# Patient Record
Sex: MALE | Race: BLACK OR AFRICAN AMERICAN | Employment: UNEMPLOYED | URBAN - METROPOLITAN AREA
[De-identification: names, ages, dates, MRNs, and addresses within clinical notes are randomized per-mention and may not be internally consistent; named-entity substitution may affect disease eponyms.]

---

## 2017-11-08 ENCOUNTER — TRANSFERRED RECORDS (OUTPATIENT)
Dept: HEALTH INFORMATION MANAGEMENT | Facility: CLINIC | Age: 37
End: 2017-11-08

## 2017-11-21 ENCOUNTER — APPOINTMENT (OUTPATIENT)
Dept: CT IMAGING | Facility: CLINIC | Age: 37
End: 2017-11-21
Attending: EMERGENCY MEDICINE
Payer: COMMERCIAL

## 2017-11-21 ENCOUNTER — HOSPITAL ENCOUNTER (EMERGENCY)
Facility: CLINIC | Age: 37
Discharge: HOME | End: 2017-11-21
Attending: EMERGENCY MEDICINE | Admitting: EMERGENCY MEDICINE
Payer: COMMERCIAL

## 2017-11-21 VITALS
WEIGHT: 105 LBS | BODY MASS INDEX: 15.91 KG/M2 | SYSTOLIC BLOOD PRESSURE: 116 MMHG | DIASTOLIC BLOOD PRESSURE: 86 MMHG | RESPIRATION RATE: 18 BRPM | OXYGEN SATURATION: 99 % | HEART RATE: 60 BPM | HEIGHT: 68 IN

## 2017-11-21 DIAGNOSIS — R10.84 ABDOMINAL PAIN, GENERALIZED: ICD-10-CM

## 2017-11-21 DIAGNOSIS — R63.4 LOSS OF WEIGHT: ICD-10-CM

## 2017-11-21 LAB
ALBUMIN SERPL-MCNC: 3.5 G/DL (ref 3.4–5)
ALBUMIN UR-MCNC: NEGATIVE MG/DL
ALP SERPL-CCNC: 40 U/L (ref 40–150)
ALT SERPL W P-5'-P-CCNC: 90 U/L (ref 0–70)
ANION GAP SERPL CALCULATED.3IONS-SCNC: 6 MMOL/L (ref 3–14)
APPEARANCE UR: CLEAR
AST SERPL W P-5'-P-CCNC: 68 U/L (ref 0–45)
BASOPHILS # BLD AUTO: 0 10E9/L (ref 0–0.2)
BASOPHILS NFR BLD AUTO: 0 %
BILIRUB SERPL-MCNC: 0.8 MG/DL (ref 0.2–1.3)
BILIRUB UR QL STRIP: NEGATIVE
BUN SERPL-MCNC: 35 MG/DL (ref 7–30)
CALCIUM SERPL-MCNC: 8.4 MG/DL (ref 8.5–10.1)
CHLORIDE SERPL-SCNC: 96 MMOL/L (ref 94–109)
CO2 SERPL-SCNC: 33 MMOL/L (ref 20–32)
COLOR UR AUTO: ABNORMAL
CREAT SERPL-MCNC: 1.03 MG/DL (ref 0.66–1.25)
CRP SERPL-MCNC: <2.9 MG/L (ref 0–8)
DIFFERENTIAL METHOD BLD: ABNORMAL
EOSINOPHIL # BLD AUTO: 0 10E9/L (ref 0–0.7)
EOSINOPHIL NFR BLD AUTO: 0.4 %
ERYTHROCYTE [DISTWIDTH] IN BLOOD BY AUTOMATED COUNT: 14 % (ref 10–15)
GFR SERPL CREATININE-BSD FRML MDRD: 80 ML/MIN/1.7M2
GLUCOSE SERPL-MCNC: 89 MG/DL (ref 70–99)
GLUCOSE UR STRIP-MCNC: NEGATIVE MG/DL
HCT VFR BLD AUTO: 42.3 % (ref 40–53)
HGB BLD-MCNC: 14 G/DL (ref 13.3–17.7)
HGB UR QL STRIP: NEGATIVE
IMM GRANULOCYTES # BLD: 0 10E9/L (ref 0–0.4)
IMM GRANULOCYTES NFR BLD: 0 %
KETONES UR STRIP-MCNC: NEGATIVE MG/DL
LACTATE BLD-SCNC: 0.8 MMOL/L (ref 0.7–2)
LEUKOCYTE ESTERASE UR QL STRIP: NEGATIVE
LIPASE SERPL-CCNC: 271 U/L (ref 73–393)
LYMPHOCYTES # BLD AUTO: 1.3 10E9/L (ref 0.8–5.3)
LYMPHOCYTES NFR BLD AUTO: 54.1 %
MAGNESIUM SERPL-MCNC: 2.3 MG/DL (ref 1.6–2.3)
MCH RBC QN AUTO: 25.9 PG (ref 26.5–33)
MCHC RBC AUTO-ENTMCNC: 33.1 G/DL (ref 31.5–36.5)
MCV RBC AUTO: 78 FL (ref 78–100)
MONOCYTES # BLD AUTO: 0.1 10E9/L (ref 0–1.3)
MONOCYTES NFR BLD AUTO: 5.2 %
MUCOUS THREADS #/AREA URNS LPF: PRESENT /LPF
NEUTROPHILS # BLD AUTO: 0.9 10E9/L (ref 1.6–8.3)
NEUTROPHILS NFR BLD AUTO: 40.3 %
NITRATE UR QL: NEGATIVE
NRBC # BLD AUTO: 0 10*3/UL
NRBC BLD AUTO-RTO: 0 /100
PH UR STRIP: 7 PH (ref 5–7)
PLATELET # BLD AUTO: 99 10E9/L (ref 150–450)
POTASSIUM SERPL-SCNC: 3.7 MMOL/L (ref 3.4–5.3)
PROT SERPL-MCNC: 7.5 G/DL (ref 6.8–8.8)
RBC # BLD AUTO: 5.41 10E12/L (ref 4.4–5.9)
RBC #/AREA URNS AUTO: <1 /HPF (ref 0–2)
SODIUM SERPL-SCNC: 135 MMOL/L (ref 133–144)
SOURCE: ABNORMAL
SP GR UR STRIP: 1.04 (ref 1–1.03)
TSH SERPL DL<=0.005 MIU/L-ACNC: 0.62 MU/L (ref 0.4–4)
UROBILINOGEN UR STRIP-MCNC: NORMAL MG/DL (ref 0–2)
WBC # BLD AUTO: 2.3 10E9/L (ref 4–11)
WBC #/AREA URNS AUTO: <1 /HPF (ref 0–2)

## 2017-11-21 PROCEDURE — 25000128 H RX IP 250 OP 636: Performed by: RADIOLOGY

## 2017-11-21 PROCEDURE — 81001 URINALYSIS AUTO W/SCOPE: CPT | Performed by: EMERGENCY MEDICINE

## 2017-11-21 PROCEDURE — 74177 CT ABD & PELVIS W/CONTRAST: CPT

## 2017-11-21 PROCEDURE — 99284 EMERGENCY DEPT VISIT MOD MDM: CPT | Mod: Z6 | Performed by: EMERGENCY MEDICINE

## 2017-11-21 PROCEDURE — 86140 C-REACTIVE PROTEIN: CPT | Performed by: EMERGENCY MEDICINE

## 2017-11-21 PROCEDURE — 99285 EMERGENCY DEPT VISIT HI MDM: CPT | Mod: 25 | Performed by: EMERGENCY MEDICINE

## 2017-11-21 PROCEDURE — 83605 ASSAY OF LACTIC ACID: CPT | Performed by: EMERGENCY MEDICINE

## 2017-11-21 PROCEDURE — 85025 COMPLETE CBC W/AUTO DIFF WBC: CPT | Performed by: EMERGENCY MEDICINE

## 2017-11-21 PROCEDURE — 80053 COMPREHEN METABOLIC PANEL: CPT | Performed by: EMERGENCY MEDICINE

## 2017-11-21 PROCEDURE — 83735 ASSAY OF MAGNESIUM: CPT | Performed by: EMERGENCY MEDICINE

## 2017-11-21 PROCEDURE — 83690 ASSAY OF LIPASE: CPT | Performed by: EMERGENCY MEDICINE

## 2017-11-21 PROCEDURE — 84443 ASSAY THYROID STIM HORMONE: CPT | Performed by: EMERGENCY MEDICINE

## 2017-11-21 RX ORDER — IOPAMIDOL 755 MG/ML
65 INJECTION, SOLUTION INTRAVASCULAR ONCE
Status: COMPLETED | OUTPATIENT
Start: 2017-11-21 | End: 2017-11-21

## 2017-11-21 RX ORDER — BISACODYL 5 MG/1
15 TABLET, DELAYED RELEASE ORAL ONCE
Qty: 4 TABLET | Refills: 0 | Status: SHIPPED | OUTPATIENT
Start: 2017-11-21 | End: 2017-11-21

## 2017-11-21 RX ADMIN — IOPAMIDOL 65 ML: 755 INJECTION, SOLUTION INTRAVENOUS at 13:41

## 2017-11-21 ASSESSMENT — ENCOUNTER SYMPTOMS
ABDOMINAL PAIN: 1
EYE REDNESS: 0
HEADACHES: 0
CONFUSION: 0
APPETITE CHANGE: 1
FEVER: 0
NECK STIFFNESS: 0
COLOR CHANGE: 0
DIFFICULTY URINATING: 0
UNEXPECTED WEIGHT CHANGE: 1
SHORTNESS OF BREATH: 0
ARTHRALGIAS: 0

## 2017-11-21 NOTE — ED AVS SNAPSHOT
[facility], Emergency Department    [address] [facility] [address]    Phone:  [phone]                                       Bruce Barrera   MRN: [id number]    Department:  [facility], Emergency Department   Date of Visit:  11/21/2017           Patient Information     Date Of Birth          1980        Your diagnoses for this visit were:     Loss of weight     Abdominal pain, generalized        You were seen by Lewis Peres MD.        Discharge Instructions       Follow up with Primary Care at the [facility] at [address] [place] [id number]. You will see Dr. Elizabeth at 1 pm on December 5th. Please cancel this appointment if you are not able to make it. [phone]    Future Appointments        Provider Department Dept Phone Center    12/5/2017 1:00 PM Javier Elizabeth [facility] [phone] [facility]      24 Hour Appointment Hotline       To make an appointment at any [facility], call [phone] ([phone]). If you don't have a family doctor or clinic, we will help you find one. [facility] clinics are conveniently located to serve the needs of you and your family.          ED Discharge Orders     COLOREC CANC SCRN,SCR COLONOSCOPY+BE           GASTROENTEROLOGY ADULT REF CONSULT ONLY       Preferred Location: [facility] [phone]      Please be aware that coverage of these services is subject to the terms and limitations of your health insurance plan.  Call member services at your health plan with any benefit or coverage questions.  Any procedures must be performed at a [facility] facility OR coordinated by your clinic's referral office.    Please bring the following with you to your appointment:    (1) Any X-Rays, CTs or MRIs which have been performed.  Contact the facility where they were done to arrange for  prior to your scheduled appointment.    (2) List of current medications   (3) This referral request   (4) Any  "documents/labs given to you for this referral            UPPER GI ENDOSCOPY                    Review of your medicines      Notice     You have not been prescribed any medications.            Procedures and tests performed during your visit     CBC with platelets differential    CRP inflammation    CT Abdomen Pelvis w Contrast    Comprehensive metabolic panel    Lactic acid    Lipase    Magnesium    Orthostatic blood pressure and pulse    TSH    UA with Microscopic reflex to Culture    Weigh patient      Orders Needing Specimen Collection     None      Pending Results     No orders found from 11/19/2017 to 11/22/2017.            Pending Culture Results     No orders found from 11/19/2017 to 11/22/2017.            Pending Results Instructions     If you had any lab results that were not finalized at the time of your Discharge, you can call the ED Lab Result RN at [phone]. You will be contacted by this team for any positive Lab results or changes in treatment. The nurses are available 7 days a week from 10A to 6:30P.  You can leave a message 24 hours per day and they will return your call.        Thank you for choosing [facility]       Thank you for choosing [facility] for your care. Our goal is always to provide you with excellent care. Hearing back from our patients is one way we can continue to improve our services. Please take a few minutes to complete the written survey that you may receive in the mail after you visit with us. Thank you!        [employer] Information     [employer] lets you send messages to your doctor, view your test results, renew your prescriptions, schedule appointments and more. To sign up, go to [email or web address] . Click on \"Log in\" on the left side of the screen, which will take you to the Welcome page. Then click on \"Sign up Now\" on the right side of the page.     You will be asked to enter the access code listed below, as well as some personal information. Please follow the directions " to create your username and password.     Your access code is: [email or web address]  Expires: 2018  5:01 PM     Your access code will  in 90 days. If you need help or a new code, please call your [facility] clinic or [phone].        Care EveryWhere ID     This is your Care EveryWhere ID. This could be used by other organizations to access your [facility] medical records  [id number]        Equal Access to Services     MADAI RAMIREZ : Hadii aad ku hadasho Sojakali, waaxda luqadaha, qaybta kaalmada adeegyada, joyce stone . So [facility] [phone].    ATENCIÓN: Si habla español, tiene a mar disposición servicios gratuitos de asistencia lingüística. Llame al [phone].    We comply with applicable federal civil rights laws and [place] laws. We do not discriminate on the basis of race, color, national origin, age, disability, sex, sexual orientation, or gender identity.            After Visit Summary       This is your record. Keep this with you and show to your community pharmacist(s) and doctor(s) at your next visit.

## 2017-11-21 NOTE — ED AVS SNAPSHOT
[facility] [place], Emergency Department    [address] [place] [address]    Phone:  [phone]                                       Bruce Barrera   MRN: [id number]    Department:  [facility], Emergency Department   Date of Visit:  11/21/2017           After Visit Summary Signature Page     I have received my discharge instructions, and my questions have been answered. I have discussed any challenges I see with this plan with the nurse or doctor.    ..........................................................................................................................................  Patient/Patient Representative Signature      ..........................................................................................................................................  Patient Representative Print Name and Relationship to Patient    ..................................................               ................................................  Date                                            Time    ..........................................................................................................................................  Reviewed by Signature/Title    ...................................................              ..............................................  Date                                                            Time

## 2017-11-21 NOTE — DISCHARGE INSTRUCTIONS
Follow up with Primary Care at the [facility] at [address] [place] [address]. You will see Dr. Elizabeth at 1 pm on December 5th. Please cancel this appointment if you are not able to make it. [phone]

## 2017-11-21 NOTE — ED NOTES
"Triage Assessment & Note:    /85  Pulse 53  Resp 18  Ht 1.727 m (5' 8\")  Wt 47.6 kg (105 lb)  SpO2 100%  BMI 15.97 kg/m2    Patient presents with: c/o abdominal pain over 6 months. PT reports a 50lb weight loss, and inability to keep PO intake down.     Home Treatments/Remedies: None    Febrile / Afebrile? Afebrile    Duration of C/o:  6 months    Jose Angel Suero  November 21, 2017      "

## 2017-11-21 NOTE — ED PROVIDER NOTES
History     Chief Complaint   Patient presents with     Abdominal Pain     EPI Barrera is a 37 year old male who presents to the ED with abdominal pain. Patient states he is from [place] and recently came to [place]. He state he has a history of chronic epigastric abdominal pan, constipation for the past 5 months, and has lost 40-50 lbs in the past 5 months. He reports his abdominal pain is made worse with eating, drinking or sitting and describes it as burning pain diffusely over his entire abdomen.  He also states he hasn't been eating much, but can tolerate fluids. He also has complaints of some shortness of breath. He is here because he wants to understand what's wrong with him. He states he has had previous work ups in the past, but they were negative. He states he did see a GI doctor in the past and who recommended he do an upper GI endoscopy, but the patient didn't schedule the appointment because the wait was too long. He did bring in an abdomen/pelvic CT report from 11/8/17 from [facility] which was negative for bowel obstruction and hydronephrosis, but did show severe reflux of contrast iliac veins, and enhancing lesions in hepatic segments.     PAST MEDICAL HISTORY  No past medical history on file.  PAST SURGICAL HISTORY  No past surgical history on file.  FAMILY HISTORY  No family history on file.  SOCIAL HISTORY  Social History   Substance Use Topics     Smoking status: Not on file     Smokeless tobacco: Not on file     Alcohol use Not on file     MEDICATIONS  No current facility-administered medications for this encounter.      Current Outpatient Prescriptions   Medication     bisacodyl (DULCOLAX) 5 MG EC tablet     polyethylene glycol (GOLYTELY/NULYTELY) 236 G suspension     ALLERGIES  No Known Allergies    I have reviewed the Medications, Allergies, Past Medical and Surgical History, and Social History in the Epic system.    Review of Systems   Constitutional: Positive for appetite  "change (loss of appetite) and unexpected weight change (loss of 40-50 lbs/5 months). Negative for fever.   HENT: Negative for congestion.    Eyes: Negative for redness.   Respiratory: Negative for shortness of breath.    Cardiovascular: Negative for chest pain.   Gastrointestinal: Positive for abdominal pain (diffuse).   Genitourinary: Negative for difficulty urinating.   Musculoskeletal: Negative for arthralgias and neck stiffness.   Skin: Negative for color change.   Neurological: Negative for headaches.   Psychiatric/Behavioral: Negative for confusion.   All other systems reviewed and are negative.      Physical Exam   BP: 115/85  Pulse: 53  Resp: 18  Height: 172.7 cm (5' 8\")  Weight: 47.6 kg (105 lb)  SpO2: 100 %  Lying Orthostatic BP: 110/92  Sitting Orthostatic BP: 118/97  Standing Orthostatic BP: 112/95      Physical Exam   Constitutional: No distress.   HENT:   Head: Atraumatic.   Mouth/Throat: Oropharynx is clear and moist. No oropharyngeal exudate.   Eyes: Pupils are equal, round, and reactive to light. No scleral icterus.   Cardiovascular: Normal heart sounds and intact distal pulses.    Pulmonary/Chest: Breath sounds normal. No respiratory distress.   Abdominal: Soft. Bowel sounds are normal. There is no tenderness.   Musculoskeletal: He exhibits no edema or tenderness.   Skin: Skin is warm. No rash noted. He is not diaphoretic.       ED Course     ED Course     Procedures   11:44 AM  The patient was seen and examined by Dr. Peres in Room 21.           Results for orders placed or performed during the hospital encounter of 11/21/17   CT Abdomen Pelvis w Contrast    Narrative    EXAMINATION: CT ABDOMEN PELVIS W CONTRAST, 11/21/2017 1:53 PM    TECHNIQUE:  Helical CT images from the lung bases through the  symphysis pubis were obtained with IV contrast. Contrast dose: 65 cc  of isovue 370    COMPARISON: None    HISTORY: Abdominal pain, weight loss, rule out malignancy;     FINDINGS: Overall the appearance of " the study is in an arterial phase,  slightly limiting interpretation.    Abdomen and pelvis: 19 x 16 mm enhancing focus in hepatic segment 6  (series 3 image 78). Additional arterial enhancing focus measuring 12  x 21 mm near the hepatic hilum. The gallbladder, spleen, adrenal  glands, and pancreas demonstrate no definite abnormality. No dilated  loops of bowel. Oral contrast is seen through the rectum. No free air.  No renal masses, stones or hydronephrosis. The bladder is relatively  distended. Abdominal aorta is nonaneurysmal. Mild reflux of contrast  into the IVC and right hepatic vein.    Lung bases: The lung bases are clear. Heart size is normal.    Bones and soft tissues: No worrisome bony lesions. Virtually no  subcutaneous fat.      Impression    IMPRESSION: Examination is technically limited by image acquisition in  the arterial phase.    1. Two indeterminate arterially enhancing lesions in the liver  representing flash filling hemangiomas in the absence of known liver  disease. Dedicated liver MRI is recommended for better  characterization.  2. Reflux of contrast into the IVC and right hepatic vein, while this  may be related to power injection, can also be seen in decreased  cardiac function. Consider echocardiogram if clinically indicated.    I have personally reviewed the examination and initial interpretation  and I agree with the findings.    SAAD LAGUNA MD ([place])   CBC with platelets differential   Result Value Ref Range    WBC 2.3 (L) 4.0 - 11.0 10e9/L    RBC Count 5.41 4.4 - 5.9 10e12/L    Hemoglobin 14.0 13.3 - 17.7 g/dL    Hematocrit 42.3 40.0 - 53.0 %    MCV 78 78 - 100 fl    MCH 25.9 (L) 26.5 - 33.0 pg    MCHC 33.1 31.5 - 36.5 g/dL    RDW 14.0 10.0 - 15.0 %    Platelet Count 99 (L) 150 - 450 10e9/L    Diff Method Automated Method     % Neutrophils 40.3 %    % Lymphocytes 54.1 %    % Monocytes 5.2 %    % Eosinophils 0.4 %    % Basophils 0.0 %    % Immature Granulocytes 0.0 %    Nucleated  RBCs 0 0 /100    Absolute Neutrophil 0.9 (L) 1.6 - 8.3 10e9/L    Absolute Lymphocytes 1.3 0.8 - 5.3 10e9/L    Absolute Monocytes 0.1 0.0 - 1.3 10e9/L    Absolute Eosinophils 0.0 0.0 - 0.7 10e9/L    Absolute Basophils 0.0 0.0 - 0.2 10e9/L    Abs Immature Granulocytes 0.0 0 - 0.4 10e9/L    Absolute Nucleated RBC 0.0    Comprehensive metabolic panel   Result Value Ref Range    Sodium 135 133 - 144 mmol/L    Potassium 3.7 3.4 - 5.3 mmol/L    Chloride 96 94 - 109 mmol/L    Carbon Dioxide 33 (H) 20 - 32 mmol/L    Anion Gap 6 3 - 14 mmol/L    Glucose 89 70 - 99 mg/dL    Urea Nitrogen 35 (H) 7 - 30 mg/dL    Creatinine 1.03 0.66 - 1.25 mg/dL    GFR Estimate 80 >60 mL/min/1.7m2    GFR Estimate If Black >90 >60 mL/min/1.7m2    Calcium 8.4 (L) 8.5 - 10.1 mg/dL    Bilirubin Total 0.8 0.2 - 1.3 mg/dL    Albumin 3.5 3.4 - 5.0 g/dL    Protein Total 7.5 6.8 - 8.8 g/dL    Alkaline Phosphatase 40 40 - 150 U/L    ALT 90 (H) 0 - 70 U/L    AST 68 (H) 0 - 45 U/L   Lactic acid   Result Value Ref Range    Lactic Acid 0.8 0.7 - 2.0 mmol/L   Lipase   Result Value Ref Range    Lipase 271 73 - 393 U/L   UA with Microscopic reflex to Culture   Result Value Ref Range    Color Urine Light Yellow     Appearance Urine Clear     Glucose Urine Negative NEG^Negative mg/dL    Bilirubin Urine Negative NEG^Negative    Ketones Urine Negative NEG^Negative mg/dL    Specific Gravity Urine 1.037 (H) 1.003 - 1.035    Blood Urine Negative NEG^Negative    pH Urine 7.0 5.0 - 7.0 pH    Protein Albumin Urine Negative NEG^Negative mg/dL    Urobilinogen mg/dL Normal 0.0 - 2.0 mg/dL    Nitrite Urine Negative NEG^Negative    Leukocyte Esterase Urine Negative NEG^Negative    Source Midstream Urine     WBC Urine <1 0 - 2 /HPF    RBC Urine <1 0 - 2 /HPF    Mucous Urine Present (A) NEG^Negative /LPF   CRP inflammation   Result Value Ref Range    CRP Inflammation <2.9 0.0 - 8.0 mg/L   TSH   Result Value Ref Range    TSH 0.62 0.40 - 4.00 mU/L   Magnesium   Result Value Ref  Range    Magnesium 2.3 1.6 - 2.3 mg/dL     Medications   iopamidol (ISOVUE-370) solution 65 mL (65 mLs Intravenous Given 11/21/17 1341)   sodium chloride (PF) 0.9% PF flush 66 mL (66 mLs Intravenous Given 11/21/17 1341)            Labs Ordered and Resulted from Time of ED Arrival Up to the Time of Departure from the ED   CBC WITH PLATELETS DIFFERENTIAL - Abnormal; Notable for the following:        Result Value    WBC 2.3 (*)     MCH 25.9 (*)     Platelet Count 99 (*)     Absolute Neutrophil 0.9 (*)     All other components within normal limits   COMPREHENSIVE METABOLIC PANEL - Abnormal; Notable for the following:     Carbon Dioxide 33 (*)     Urea Nitrogen 35 (*)     Calcium 8.4 (*)     ALT 90 (*)     AST 68 (*)     All other components within normal limits   ROUTINE UA WITH MICROSCOPIC REFLEX TO CULTURE - Abnormal; Notable for the following:     Specific Gravity Urine 1.037 (*)     Mucous Urine Present (*)     All other components within normal limits   LACTIC ACID WHOLE BLOOD   LIPASE   CRP INFLAMMATION   TSH   MAGNESIUM   ORTHOSTATIC BLOOD PRESSURE AND PULSE   MEASURE WEIGHT            Assessments & Plan (with Medical Decision Making)   37-year-old male presents for evaluation of ongoing abdominal pain with associated weight loss that is unintentional and more recently shortness breath.  Differential is broad and includes pancreatitis, bowel obstruction, other GI pathology, cancer.  Exam revealed that his vital signs were stable with heart rate of 53 and oxygen saturation 100%.  Patient appeared chronically malnourished.  Laboratories were obtained including CBC revealing neutropenia and thrombocytopenia.  Combivent comprehensive metabolic panel revealed slight elevation in carbon dioxide and BUN as well as mild elevations in AST and ALT.  Lactic acid, lipase, CRP, TSH and magnesium were normal.  CT of the abdomen was done revealing 2 indeterminat lesions of the liver as well as reflux of contrast into the IVC  and right hip headache vein.  Old records were reviewed revealing MRI performed on November 10 in [place] revealing findings consistent with focal nodular hyperplasia.  Also recommendations for outpatient colonoscopy and endoscopy were made at that time.  Social work was consulted and the case discussed at length with him.  We were able to arrange outpatient colonoscopy and endoscopy as well as follow-up with a primary care provider.  Patient will return with worsening symptoms.     I have reviewed the nursing notes.    I have reviewed the findings, diagnosis, plan and need for follow up with the patient.    There are no discharge medications for this patient.      Final diagnoses:   Loss of weight   Abdominal pain, generalized     IDavid, am serving as a trained medical scribe to document services personally performed by Gaetano Peres MD, based on the provider's statements to me.      Gaetano PRESSLEY MD, was physically present and have reviewed and verified the accuracy of this note documented by David Briceno.     11/21/2017   [facility] [place], EMERGENCY DEPARTMENT     Lewis Peres MD  11/21/17 1937

## 2017-11-21 NOTE — PROGRESS NOTES
Emergency Social Work Services Note    Date of  Intervention: 11/21/17  Last Emergency Department Visit:  N/A  Care Plan:  None  Collaborated with:  Pt, Pt's relative Ellen, and chart review    Data:  Bruce Barrera is a 37 year old male who presents to the ED with abdominal pain. Patient states he is from [place] and recently came to [place]. He state he has a history of chronic epigastric abdominal pan, constipation for the past 5 months, and has lost 40-50 lbs in the past 5 months. He reports his abdominal pain is made worse with eating, drinking or sitting and describes it as burning pain diffusely over his entire abdomen.  He also states he hasn't been eating much, but can tolerate fluids. He also has complaints of some shortness of breath. He is here because he wants to understand what's wrong with him. He states he has had previous work ups in the past, but they were negative. He states he did see a GI doctor in the past and who recommended he do an upper GI endoscopy, but the patient didn't schedule the appointment because the wait was too long. He did bring in an abdomen/pelvic CT report from 11/8/17 from [facility] which was negative for bowel obstruction and hydronephrosis, but did show severe reflux of contrast iliac veins, and enhancing lesions in hepatic segments.     Bruce plans to stay [place] in the cities until he can figure out what is wrong with him. He is on short-term disability from work. Per ED MD, he does not meet hospital admission. ED SW consulted to set up endoscopy/colonoscopy outpatient at the upper endoscopy clinic.    Intervention:  LIZ spoke with pt and his female relative Ellen. LIZ called pt's insurance to see if he has coverage in [place]. SW scheduled endoscopy, colonoscopy, and primary care follow up. Gave pt printed instructions from  on how to prep for procedures. Brought pt's medical record from [place] to Dr. Galeana.    Assessment:  Pt was  hoping to get admitted to get emergent colonoscopy/endoscopy. This procedures could not be done quicker inpt due to needing to prep. Pt needs to keep appointment scheduled with [facility] on December 5th or inform Dr. Galeana where results should be sent. Pt is aware and also states he is comfortable with the amount they will cover. SW advised she could not guarantee benefits, so pt and SW spoke with insurance together at first and then insurance spoke with [employer] again to review. He was advised he has a national plan and they will pay for enough of the procedures that he feels comfortable having them,    Plan:    Anticipated Disposition:  Home with follow-up with outpatient upper endoscopy clinic and PCP    Barriers to d/c plan:  None, pt discharged.    Follow Up:  Pt to have upper and lower endoscopy (colonoscopy) 11/22/17 at 1:30 pm on the first floor of the hospital with Dr. Galeana. Ph: [phone]. ED MD prescribed go-lightly laxative and pt to  from discharge pharmacy.     Results need to be sent to primary provider. LIZ set up [facility] appointment on December 5th with resident Javier Elizabeth and following doctor. Pt strongly advised that if he chooses to seek GI specialist or primary care provider elsewhere, he MUST cancel this appointment AND advise Dr. Galeana where results should be sent. Pt given address and phone number [phone].    Pt expresses understanding of what he needs to do to prep for exam and advised he can have someone drive him to and from the appointment.     Radha DIANA, [facility]  ED   ED Pager: [phone]  On-call/After hours Pager: [phone]

## 2017-11-22 ENCOUNTER — SURGERY (OUTPATIENT)
Age: 37
End: 2017-11-22

## 2017-11-22 ENCOUNTER — HOSPITAL ENCOUNTER (OUTPATIENT)
Facility: CLINIC | Age: 37
Discharge: HOME | End: 2017-11-22
Attending: INTERNAL MEDICINE | Admitting: INTERNAL MEDICINE
Payer: COMMERCIAL

## 2017-11-22 VITALS
OXYGEN SATURATION: 98 % | RESPIRATION RATE: 9 BRPM | SYSTOLIC BLOOD PRESSURE: 91 MMHG | DIASTOLIC BLOOD PRESSURE: 81 MMHG

## 2017-11-22 DIAGNOSIS — K59.00 CONSTIPATION, UNSPECIFIED CONSTIPATION TYPE: ICD-10-CM

## 2017-11-22 DIAGNOSIS — K29.00 ACUTE SUPERFICIAL GASTRITIS WITHOUT HEMORRHAGE: Primary | ICD-10-CM

## 2017-11-22 LAB
COLONOSCOPY: NORMAL
UPPER GI ENDOSCOPY: NORMAL

## 2017-11-22 PROCEDURE — 43239 EGD BIOPSY SINGLE/MULTIPLE: CPT | Performed by: INTERNAL MEDICINE

## 2017-11-22 PROCEDURE — 25000132 ZZH RX MED GY IP 250 OP 250 PS 637: Performed by: INTERNAL MEDICINE

## 2017-11-22 PROCEDURE — 99152 MOD SED SAME PHYS/QHP 5/>YRS: CPT | Performed by: INTERNAL MEDICINE

## 2017-11-22 PROCEDURE — 25000125 ZZHC RX 250: Performed by: INTERNAL MEDICINE

## 2017-11-22 PROCEDURE — 25000128 H RX IP 250 OP 636: Performed by: INTERNAL MEDICINE

## 2017-11-22 PROCEDURE — 99153 MOD SED SAME PHYS/QHP EA: CPT | Performed by: INTERNAL MEDICINE

## 2017-11-22 PROCEDURE — 40000556 ZZH STATISTIC PERIPHERAL IV START W US GUIDANCE

## 2017-11-22 PROCEDURE — 45378 DIAGNOSTIC COLONOSCOPY: CPT | Performed by: INTERNAL MEDICINE

## 2017-11-22 PROCEDURE — 88305 TISSUE EXAM BY PATHOLOGIST: CPT | Performed by: INTERNAL MEDICINE

## 2017-11-22 RX ORDER — FENTANYL CITRATE 50 UG/ML
INJECTION, SOLUTION INTRAMUSCULAR; INTRAVENOUS PRN
Status: DISCONTINUED | OUTPATIENT
Start: 2017-11-22 | End: 2017-11-22 | Stop reason: HOSPADM

## 2017-11-22 RX ORDER — SIMETHICONE
LIQUID (ML) MISCELLANEOUS PRN
Status: DISCONTINUED | OUTPATIENT
Start: 2017-11-22 | End: 2017-11-22 | Stop reason: HOSPADM

## 2017-11-22 RX ORDER — POLYETHYLENE GLYCOL 3350 17 G/17G
1 POWDER, FOR SOLUTION ORAL DAILY
Qty: 510 G | Refills: 2 | Status: SHIPPED | OUTPATIENT
Start: 2017-11-22 | End: 2018-02-20

## 2017-11-22 RX ADMIN — BENZOCAINE 1 SPRAY: 220 SPRAY, METERED PERIODONTAL at 15:28

## 2017-11-22 RX ADMIN — Medication 2 ML: at 14:53

## 2017-11-22 RX ADMIN — FENTANYL CITRATE 50 MCG: 50 INJECTION, SOLUTION INTRAMUSCULAR; INTRAVENOUS at 15:51

## 2017-11-22 RX ADMIN — FENTANYL CITRATE 50 MCG: 50 INJECTION, SOLUTION INTRAMUSCULAR; INTRAVENOUS at 15:28

## 2017-11-22 RX ADMIN — MIDAZOLAM HYDROCHLORIDE 1 MG: 1 INJECTION, SOLUTION INTRAMUSCULAR; INTRAVENOUS at 15:33

## 2017-11-22 RX ADMIN — MIDAZOLAM HYDROCHLORIDE 1 MG: 1 INJECTION, SOLUTION INTRAMUSCULAR; INTRAVENOUS at 15:28

## 2017-11-22 NOTE — OR NURSING
EGD with biopsies and colonoscopy completed and pt tolerated well with conscious sedation and on 2L nc oxygen.

## 2017-11-22 NOTE — IP AVS SNAPSHOT
[facility], [place], Endoscopy    [address] [address]    Phone:  [phone]                                       After Visit Summary   11/22/2017    Bruce Barrera    MRN: [id number]           After Visit Summary Signature Page     I have received my discharge instructions, and my questions have been answered. I have discussed any challenges I see with this plan with the nurse or doctor.    ..........................................................................................................................................  Patient/Patient Representative Signature      ..........................................................................................................................................  Patient Representative Print Name and Relationship to Patient    ..................................................               ................................................  Date                                            Time    ..........................................................................................................................................  Reviewed by Signature/Title    ...................................................              ..............................................  Date                                                            Time

## 2017-11-22 NOTE — IP AVS SNAPSHOT
MRN:[id number]                      After Visit Summary   11/22/2017    Bruce Barrera    MRN: [id number]           Thank you!     Thank you for choosing [facility] for your care. Our goal is always to provide you with excellent care. Hearing back from our patients is one way we can continue to improve our services. Please take a few minutes to complete the written survey that you may receive in the mail after you visit with us. Thank you!        Patient Information     Date Of Birth          1980        About your hospital stay     You were admitted on:  November 22, 2017 You last received care in the:  [facility], Endoscopy    You were discharged on:  November 22, 2017       Who to Call     For medical emergencies, please call 911.  For non-urgent questions about your medical care, please call your primary care provider or clinic, None  For questions related to your surgery, please call your surgery clinic        Attending Provider     Provider Mukesh Jacobson MD Gastroenterology       Primary Care Provider    Physician No Ref-Primary      Your next 10 appointments already scheduled     Dec 05, 2017  1:00 PM CST   (Arrive by 12:45 PM)   New Patient Visit with Javier Elizabeth DO   [facility] ([facility])    [address] [place] [address] [phone]              Further instructions from your care team       Discharge Instructions after  Upper Endoscopy (EGD)    Activity and Diet  You were given medicine for pain. You may be dizzy or sleepy.  For 24 hours:    Do not drive or use heavy equipment.    Do not make important decisions.    Do not drink any alcohol.  _x__ You may return to your regular diet.    Discomfort  You may have a sore throat for 2 to 3 days. It may help to:    Avoid hot liquids for 24 hours.    Use sore throat lozenges.    Gargle as needed with salt water up to 4 times a day.  Mix 1 cup of warm water  with 1 teaspoon of salt. Do not swallow.    You may take Tylenol (acetaminophen) for pain unless your doctor has told you not to.    Do not take aspirin or ibuprofen (Advil, Motrin) or other NSAIDS  (anti-inflammatory drugs) for _3__ days.    Follow-up  _x__ We took small tissue samples for study. If you do not have a follow-up visit scheduled,  call your provider s office in 2 weeks for the results.    When to call us:  Problems are rare. Call right away if you have:    Unusual throat pain or trouble swallowing    Unusual pain in belly or chest that is not relieved by belching or passing air    Black stools (tar-like looking bowel movement)    Temperature above 100.6  F. (37.5  C).    If you vomit blood or have severe pain, go to an emergency room.    If you have questions, call:  Monday to Friday, 7 a.m. to 4:30 p.m.: Endoscopy: [phone] (We may have to call you back)    After hours: Hospital: [phone] (Ask for the GI fellow on call)    Discharge Instructions after Colonoscopy    Today you had a Colonoscopy     Activity and Diet  You were given medicine for pain. You may be dizzy or sleepy.  For 24 hours:    Do not drive or use heavy equipment.    Do not make important decisions.    Do not drink any alcohol.  You may return to your normal diet and medicines.    Discomfort    Air was placed in your colon during the exam in order to see it. Walking helps to pass the air.    You may take Tylenol (acetaminophen) for pain unless your doctor has told you not to.  Do not take aspirin or ibuprofen (Advil, Motrin, or other anti-inflammatory  drugs) for __3___ days.    Follow-up  ____ We took small tissue samples or polyps to study. Your doctor will call you with the results  within two weeks.    When to call:    Call right away if you have:    Unusual pain in belly or chest pain not relieved with passing air.    More than 1 to 2 Tablespoons of bleeding from your rectum.    Fever above  "100.6  F (37.5  C).    If you have severe pain, bleeding, or shortness of breath, go to an emergency room.    If you have questions, call:  Monday to Friday, 7 a.m. to 4:30 p.m.  Endoscopy: [phone] (We may have to call you back)    After hours  Hospital: [phone] (Ask for the GI fellow on call)    Pending Results     Date and Time Order Name Status Description    2017 1545 Surgical pathology exam In process             Admission Information     Date & Time Provider Department Dept. Phone    2017 Mukesh Galeana MD [facility], [place], Endoscopy [phone]      Your Vitals Were     Blood Pressure Respirations Pulse Oximetry             111/81 37 97%         MyChart Information     [employer] lets you send messages to your doctor, view your test results, renew your prescriptions, schedule appointments and more. To sign up, go to [email or web address] . Click on \"Log in\" on the left side of the screen, which will take you to the Welcome page. Then click on \"Sign up Now\" on the right side of the page.     You will be asked to enter the access code listed below, as well as some personal information. Please follow the directions to create your username and password.     Your access code is: [email or web address]  Expires: 2018  5:01 PM     Your access code will  in 90 days. If you need help or a new code, please call your [place] clinic or [phone].        Care EveryWhere ID     This is your Care EveryWhere ID. This could be used by other organizations to access your [place] medical records  [id number]        Equal Access to Services     MADAI RAMIREZ : Hadii shital Burleson, daiana carpio, joyce osorio . So [facility] [phone].    ATENCIÓN: Si habla español, tiene a mar disposición servicios gratuitos de asistencia lingüística. Llame al [phone].    We comply with applicable federal civil rights laws and [place] laws. We " "do not discriminate on the basis of race, color, national origin, age, disability, sex, sexual orientation, or gender identity.               Review of your medicines      UNREVIEWED medicines. Ask your doctor about these medicines        Dose / Directions    bisacodyl 5 MG EC tablet   Commonly known as:  DULCOLAX   Ask about: Should I take this medication?        Dose:  15 mg   Take 3 tablets (15 mg) by mouth once for 1 dose Refer to \"Getting Ready for a Colonoscopy\" instruction handout   Quantity:  4 tablet   Refills:  0       polyethylene glycol 236 G suspension   Commonly known as:  GoLYTELY/NuLYTELY   Ask about: Should I take this medication?        Dose:  4 L   Take 4,000 mLs (4 L) by mouth once for 1 dose Refer to \"Getting Ready for a Colonoscopy\" instruction handout   Quantity:  4000 mL   Refills:  0         START taking        Dose / Directions    omeprazole 20 MG CR capsule   Commonly known as:  priLOSEC   Used for:  Acute superficial gastritis without hemorrhage        Dose:  20 mg   Take 1 capsule (20 mg) by mouth daily   Quantity:  90 capsule   Refills:  1       polyethylene glycol powder   Commonly known as:  MIRALAX   Used for:  Constipation, unspecified constipation type        Dose:  1 capful   Take 17 g (1 capful) by mouth daily   Quantity:  510 g   Refills:  2            Where to get your medicines      These medications were sent to [facility]/pharmacy #[id number] - [place], [place] - [address] AT [address] [address]     Phone:  [phone]     omeprazole 20 MG CR capsule    polyethylene glycol powder                Protect others around you: Learn how to safely use, store and throw away your medicines at www.disposemymeds.org.             Medication List: This is a list of all your medications and when to take them. Check marks below indicate your daily home schedule. Keep this list as a reference.      Medications           Morning " "Afternoon Evening Bedtime As Needed    omeprazole 20 MG CR capsule   Commonly known as:  priLOSEC   Take 1 capsule (20 mg) by mouth daily                                polyethylene glycol powder   Commonly known as:  MIRALAX   Take 17 g (1 capful) by mouth daily                                  ASK your doctor about these medications           Morning Afternoon Evening Bedtime As Needed    bisacodyl 5 MG EC tablet   Commonly known as:  DULCOLAX   Take 3 tablets (15 mg) by mouth once for 1 dose Refer to \"Getting Ready for a Colonoscopy\" instruction handout   Ask about: Should I take this medication?                                polyethylene glycol 236 G suspension   Commonly known as:  GoLYTELY/NuLYTELY   Take 4,000 mLs (4 L) by mouth once for 1 dose Refer to \"Getting Ready for a Colonoscopy\" instruction handout   Ask about: Should I take this medication?                                  "

## 2017-11-22 NOTE — DISCHARGE INSTRUCTIONS
Discharge Instructions after  Upper Endoscopy (EGD)    Activity and Diet  You were given medicine for pain. You may be dizzy or sleepy.  For 24 hours:    Do not drive or use heavy equipment.    Do not make important decisions.    Do not drink any alcohol.  _x__ You may return to your regular diet.    Discomfort  You may have a sore throat for 2 to 3 days. It may help to:    Avoid hot liquids for 24 hours.    Use sore throat lozenges.    Gargle as needed with salt water up to 4 times a day. Mix 1 cup of warm water  with 1 teaspoon of salt. Do not swallow.    You may take Tylenol (acetaminophen) for pain unless your doctor has told you not to.    Do not take aspirin or ibuprofen (Advil, Motrin) or other NSAIDS  (anti-inflammatory drugs) for _3__ days.    Follow-up  _x__ We took small tissue samples for study. If you do not have a follow-up visit scheduled,  call your provider s office in 2 weeks for the results.    When to call us:  Problems are rare. Call right away if you have:    Unusual throat pain or trouble swallowing    Unusual pain in belly or chest that is not relieved by belching or passing air    Black stools (tar-like looking bowel movement)    Temperature above 100.6  F. (37.5  C).    If you vomit blood or have severe pain, go to an emergency room.    If you have questions, call:  Monday to Friday, 7 a.m. to 4:30 p.m.: Endoscopy: [phone] (We may have to call you back)    After hours: Hospital: [phone] (Ask for the GI fellow on call)    Discharge Instructions after Colonoscopy    Today you had a Colonoscopy     Activity and Diet  You were given medicine for pain. You may be dizzy or sleepy.  For 24 hours:    Do not drive or use heavy equipment.    Do not make important decisions.    Do not drink any alcohol.  You may return to your normal diet and medicines.    Discomfort    Air was placed in your colon during the exam in order to see it. Walking helps to pass the air.    You may take Tylenol  (acetaminophen) for pain unless your doctor has told you not to.  Do not take aspirin or ibuprofen (Advil, Motrin, or other anti-inflammatory  drugs) for __3___ days.    Follow-up  ____ We took small tissue samples or polyps to study. Your doctor will call you with the results  within two weeks.    When to call:    Call right away if you have:    Unusual pain in belly or chest pain not relieved with passing air.    More than 1 to 2 Tablespoons of bleeding from your rectum.    Fever above 100.6  F (37.5  C).    If you have severe pain, bleeding, or shortness of breath, go to an emergency room.    If you have questions, call:  Monday to Friday, 7 a.m. to 4:30 p.m.  Endoscopy: [phone] (We may have to call you back)    After hours  Hospital: [phone] (Ask for the GI fellow on call)

## 2017-11-24 LAB — COPATH REPORT: NORMAL

## 2017-11-27 ENCOUNTER — TELEPHONE (OUTPATIENT)
Dept: GASTROENTEROLOGY | Facility: CLINIC | Age: 37
End: 2017-11-27

## 2017-11-27 NOTE — TELEPHONE ENCOUNTER
Unable to reach pt by phone re recent gastric biopsy results showing H pylori. Currently on omeprazole 20 mg po qd.    Will continue to attempt and if not successful send letter.  Plan will be for increase of omeprazole to 20 mg bid plus clarithromycin and amoxicillin for total 2 weeks, then stop. Will also arrange for followup fecal antigen to confirm eradication.    Did not have ulcer disease and thus does not need longer course of treatment with PPI unless symptoms persist.    KULDEEP Galeana MD  Associate Professor of Medicine  Division of Gastroenterology, Hepatology and Nutrition  [facility]

## 2017-11-29 DIAGNOSIS — B96.81 HELICOBACTER PYLORI GASTRITIS: Primary | ICD-10-CM

## 2017-11-29 DIAGNOSIS — K29.70 HELICOBACTER PYLORI GASTRITIS: Primary | ICD-10-CM

## 2017-11-29 RX ORDER — AMOXICILLIN 500 MG/1
1000 CAPSULE ORAL 2 TIMES DAILY
Qty: 56 CAPSULE | Refills: 0 | Status: SHIPPED | OUTPATIENT
Start: 2017-11-29 | End: 2019-07-16

## 2017-11-29 RX ORDER — CLARITHROMYCIN 500 MG
500 TABLET ORAL 2 TIMES DAILY
Qty: 28 TABLET | Refills: 0 | Status: SHIPPED | OUTPATIENT
Start: 2017-11-29 | End: 2019-07-16

## 2017-11-29 NOTE — PROGRESS NOTES
Called pt and discussed positive H pylori.    He states that he is currently not having stomach pain (unclear if this is related to the starting of omeprazole).   Complaining of loose stools since starting miralax and has stopped.    Instructed pt to start new scripts for clarithromycin and amoxicillin. Complete all of two week course. Increase omeprazole to bid for these two weeks, then stop this when antibiotics completed.    Will arrange for fecal antigen testing in two months to confirm eradication.   He requested results be sent to his primary care in [place]. My office will arrange.      KULDEEP Galeana MD  Associate Professor of Medicine  Division of Gastroenterology, Hepatology and Nutrition  [facility]

## 2017-12-05 ENCOUNTER — OFFICE VISIT (OUTPATIENT)
Dept: INTERNAL MEDICINE | Facility: CLINIC | Age: 37
End: 2017-12-05

## 2017-12-05 VITALS
TEMPERATURE: 98.2 F | HEART RATE: 77 BPM | HEIGHT: 66 IN | DIASTOLIC BLOOD PRESSURE: 76 MMHG | SYSTOLIC BLOOD PRESSURE: 113 MMHG | WEIGHT: 99.9 LBS | BODY MASS INDEX: 16.05 KG/M2

## 2017-12-05 DIAGNOSIS — D69.6 THROMBOCYTOPENIA (H): ICD-10-CM

## 2017-12-05 DIAGNOSIS — R21 RASH: ICD-10-CM

## 2017-12-05 DIAGNOSIS — D70.9 NEUTROPENIA, UNSPECIFIED TYPE (H): ICD-10-CM

## 2017-12-05 DIAGNOSIS — R63.4 WEIGHT LOSS: ICD-10-CM

## 2017-12-05 ASSESSMENT — ENCOUNTER SYMPTOMS
SORE THROAT: 0
DISTURBANCES IN COORDINATION: 0
PALPITATIONS: 0
TINGLING: 0
BLOOD IN STOOL: 0
SEIZURES: 0
SMELL DISTURBANCE: 0
MYALGIAS: 0
JAUNDICE: 0
HEARTBURN: 0
WEIGHT LOSS: 1
EYE WATERING: 0
RESPIRATORY PAIN: 0
HEMATURIA: 0
EXERCISE INTOLERANCE: 0
TREMORS: 0
EYE PAIN: 0
MUSCLE CRAMPS: 0
POLYDIPSIA: 0
SWOLLEN GLANDS: 0
DYSURIA: 0
INSOMNIA: 0
DEPRESSION: 0
HYPOTENSION: 0
ABDOMINAL PAIN: 1
POSTURAL DYSPNEA: 0
LOSS OF CONSCIOUSNESS: 0
PANIC: 0
COUGH: 0
HEMOPTYSIS: 0
SINUS CONGESTION: 0
DECREASED CONCENTRATION: 0
ORTHOPNEA: 0
DOUBLE VISION: 0
COUGH DISTURBING SLEEP: 0
EYE IRRITATION: 0
SHORTNESS OF BREATH: 0
SNORES LOUDLY: 0
NAUSEA: 0
HOARSE VOICE: 0
SPUTUM PRODUCTION: 0
BRUISES/BLEEDS EASILY: 0
SYNCOPE: 0
DIFFICULTY URINATING: 0
RECTAL PAIN: 0
ARTHRALGIAS: 0
DYSPNEA ON EXERTION: 0
CLAUDICATION: 0
LEG SWELLING: 0
ALTERED TEMPERATURE REGULATION: 0
WHEEZING: 0
HEADACHES: 0
NECK MASS: 0
NAIL CHANGES: 0
MUSCLE WEAKNESS: 0
DIARRHEA: 0
INCREASED ENERGY: 1
NUMBNESS: 0
NECK PAIN: 0
JOINT SWELLING: 0
WEAKNESS: 0
TROUBLE SWALLOWING: 0
TACHYCARDIA: 0
FATIGUE: 1
STIFFNESS: 0
SPEECH CHANGE: 0
SKIN CHANGES: 0
CONSTIPATION: 1
TASTE DISTURBANCE: 0
LEG PAIN: 0
BLOATING: 1
NERVOUS/ANXIOUS: 0
MEMORY LOSS: 0
BACK PAIN: 0
SINUS PAIN: 0
FLANK PAIN: 0
BOWEL INCONTINENCE: 0
DIZZINESS: 0
SLEEP DISTURBANCES DUE TO BREATHING: 0
EXTREMITY NUMBNESS: 0
EYE REDNESS: 0
LIGHT-HEADEDNESS: 0
HYPERTENSION: 0
POOR WOUND HEALING: 0
PARALYSIS: 0
VOMITING: 0

## 2017-12-05 ASSESSMENT — PAIN SCALES - GENERAL: PAINLEVEL: MODERATE PAIN (4)

## 2017-12-05 NOTE — PATIENT INSTRUCTIONS
[facility]: [phone]     [facility] Medication Refill Request Information:  * Please contact your pharmacy regarding ANY request for medication refills.  ** [facility] Prescription Fax = [phone]  * Please allow 3 business days for routine medication refills.  * Please allow 5 business days for controlled substance medication refills.     [facility] Test Result notification information:  *You will be notified with in 7-10 days of your appointment day regarding the results of your test.  If you are on MyChart you will be notified as soon as the provider has reviewed the results and signed off on them.

## 2017-12-05 NOTE — MR AVS SNAPSHOT
After Visit Summary   12/5/2017    Bruce Barrera    MRN: [id number]           Patient Information     Date Of Birth          1980        Visit Information        Provider Department      12/5/2017 1:00 PM Javier Elizabeth [facility]        Today's Diagnoses     Elevation of level of transaminase or lactic acid dehydrogenase (LDH)    -  1    Weight loss        Neutropenia, unspecified type (H)          Care Instructions    Primary Care Center: [phone]     Primary Care Center Medication Refill Request Information:  * Please contact your pharmacy regarding ANY request for medication refills.  ** [facility] Prescription Fax = [phone]  * Please allow 3 business days for routine medication refills.  * Please allow 5 business days for controlled substance medication refills.     Primary Care Center Test Result notification information:  *You will be notified with in 7-10 days of your appointment day regarding the results of your test.  If you are on MyChart you will be notified as soon as the provider has reviewed the results and signed off on them.            Follow-ups after your visit        Your next 10 appointments already scheduled     Dec 05, 2017  3:00 PM CST   LAB with [facility] ([facility])    [address] [place] [address] [phone]           Please do not eat 10-12 hours before your appointment if you are coming in fasting for labs on lipids, cholesterol, or glucose (sugar). This does not apply to pregnant women. Water, hot tea and black coffee (with nothing added) are okay. Do not drink other fluids, diet soda or chew gum.              Future tests that were ordered for you today     Open Future Orders        Priority Expected Expires Ordered    HCV Screen with Reflex Routine 12/5/2017 12/5/2018 12/5/2017    HIV Antigen Antibody Combo Routine 12/5/2017 12/5/2018 12/5/2017    TSH with free T4  "reflex Routine 12/5/2017 12/19/2017 12/5/2017            Who to contact     Please call your clinic at [phone] to:    Ask questions about your health    Make or cancel appointments    Discuss your medicines    Learn about your test results    Speak to your doctor   If you have compliments or concerns about an experience at your clinic, or if you wish to file a complaint, please contact [facility] Patient Relations at [phone] or email us at [email or web address]         Additional Information About Your Visit        [employer] Information     [employer] gives you secure access to your electronic health record. If you see a primary care provider, you can also send messages to your care team and make appointments. If you have questions, please call your primary care clinic.  If you do not have a primary care provider, please call [phone] and they will assist you.      [employer] is an electronic gateway that provides easy, online access to your medical records. With [employer], you can request a clinic appointment, read your test results, renew a prescription or communicate with your care team.     To access your existing account, please contact your [facility] or call [phone] for assistance.        Care EveryWhere ID     This is your Care EveryWhere ID. This could be used by other organizations to access your [facility] medical records  [id number]        Your Vitals Were     Pulse Temperature Height BMI (Body Mass Index)          77 98.2  F (36.8  C) (Oral) 1.68 m (5' 6.14\") 16.06 kg/m2         Blood Pressure from Last 3 Encounters:   12/05/17 113/76   11/22/17 91/81   11/21/17 116/86    Weight from Last 3 Encounters:   12/05/17 45.3 kg (99 lb 14.4 oz)   11/21/17 47.6 kg (105 lb)               Primary Care Provider Fax #    Physician No Ref-Primary [phone]       No address on file        Equal Access to Services     MADAI RAMIREZ AH: Hadii " shital Burleson, daiana carpio, qamicata kakapil jeradkaren, waxcharlie jona edwardsfranciscosarah stone sandip. So [facility] [phone].    ATENCIÓN: Si habla español, tiene a mar disposición servicios gratuitos de asistencia lingüística. Llame al [phone].    We comply with applicable federal civil rights laws and [place] laws. We do not discriminate on the basis of race, color, national origin, age, disability, sex, sexual orientation, or gender identity.            Thank you!     Thank you for choosing [facility]  for your care. Our goal is always to provide you with excellent care. Hearing back from our patients is one way we can continue to improve our services. Please take a few minutes to complete the written survey that you may receive in the mail after your visit with us. Thank you!             Your Updated Medication List - Protect others around you: Learn how to safely use, store and throw away your medicines at www.disposemymeds.org.          This list is accurate as of: 12/5/17  2:35 PM.  Always use your most recent med list.                   Brand Name Dispense Instructions for use Diagnosis    amoxicillin 500 MG capsule    AMOXIL    56 capsule    Take 2 capsules (1,000 mg) by mouth 2 times daily    Helicobacter pylori gastritis       clarithromycin 500 MG tablet    BIAXIN    28 tablet    Take 1 tablet (500 mg) by mouth 2 times daily    Helicobacter pylori gastritis       omeprazole 20 MG CR capsule    priLOSEC    90 capsule    Take 1 capsule (20 mg) by mouth daily    Acute superficial gastritis without hemorrhage       polyethylene glycol powder    MIRALAX    510 g    Take 17 g (1 capful) by mouth daily    Constipation, unspecified constipation type

## 2017-12-05 NOTE — NURSING NOTE
Chief Complaint   Patient presents with     Establish [facility]     pt here to [facility] F/U     pt here following a hospital visit     Valerie Adams CMA at 1:21 PM on 12/5/2017.

## 2018-06-07 ENCOUNTER — HOSPITAL ENCOUNTER (EMERGENCY)
Facility: CLINIC | Age: 38
Discharge: HOME | End: 2018-06-07
Attending: EMERGENCY MEDICINE | Admitting: EMERGENCY MEDICINE
Payer: COMMERCIAL

## 2018-06-07 ENCOUNTER — APPOINTMENT (OUTPATIENT)
Dept: GENERAL RADIOLOGY | Facility: CLINIC | Age: 38
End: 2018-06-07
Attending: EMERGENCY MEDICINE
Payer: COMMERCIAL

## 2018-06-07 VITALS
OXYGEN SATURATION: 99 % | WEIGHT: 110 LBS | BODY MASS INDEX: 17.68 KG/M2 | SYSTOLIC BLOOD PRESSURE: 95 MMHG | RESPIRATION RATE: 18 BRPM | HEART RATE: 72 BPM | DIASTOLIC BLOOD PRESSURE: 72 MMHG | HEIGHT: 66 IN | TEMPERATURE: 97.9 F

## 2018-06-07 DIAGNOSIS — D64.9 ANEMIA, UNSPECIFIED TYPE: ICD-10-CM

## 2018-06-07 DIAGNOSIS — R07.9 ACUTE CHEST PAIN: ICD-10-CM

## 2018-06-07 DIAGNOSIS — K29.00 ACUTE SUPERFICIAL GASTRITIS WITHOUT HEMORRHAGE: ICD-10-CM

## 2018-06-07 DIAGNOSIS — R00.1 SEVERE SINUS BRADYCARDIA: ICD-10-CM

## 2018-06-07 DIAGNOSIS — R63.4 LOSS OF WEIGHT: ICD-10-CM

## 2018-06-07 LAB
ALBUMIN SERPL-MCNC: 2.7 G/DL (ref 3.4–5)
ALBUMIN UR-MCNC: NEGATIVE MG/DL
ALP SERPL-CCNC: 57 U/L (ref 40–150)
ALT SERPL W P-5'-P-CCNC: 48 U/L (ref 0–70)
ANION GAP SERPL CALCULATED.3IONS-SCNC: 4 MMOL/L (ref 3–14)
APPEARANCE UR: CLEAR
AST SERPL W P-5'-P-CCNC: 22 U/L (ref 0–45)
BASOPHILS # BLD AUTO: 0 10E9/L (ref 0–0.2)
BASOPHILS NFR BLD AUTO: 0.5 %
BILIRUB SERPL-MCNC: 0.2 MG/DL (ref 0.2–1.3)
BILIRUB UR QL STRIP: NEGATIVE
BUN SERPL-MCNC: 8 MG/DL (ref 7–30)
CALCIUM SERPL-MCNC: 8 MG/DL (ref 8.5–10.1)
CHLORIDE SERPL-SCNC: 112 MMOL/L (ref 94–109)
CO2 SERPL-SCNC: 29 MMOL/L (ref 20–32)
COLOR UR AUTO: ABNORMAL
CREAT SERPL-MCNC: 0.56 MG/DL (ref 0.66–1.25)
DIFFERENTIAL METHOD BLD: ABNORMAL
EOSINOPHIL # BLD AUTO: 0 10E9/L (ref 0–0.7)
EOSINOPHIL NFR BLD AUTO: 0.5 %
ERYTHROCYTE [DISTWIDTH] IN BLOOD BY AUTOMATED COUNT: 14 % (ref 10–15)
GFR SERPL CREATININE-BSD FRML MDRD: >90 ML/MIN/1.7M2
GLUCOSE BLDC GLUCOMTR-MCNC: 83 MG/DL (ref 70–99)
GLUCOSE SERPL-MCNC: 84 MG/DL (ref 70–99)
GLUCOSE UR STRIP-MCNC: NEGATIVE MG/DL
HCT VFR BLD AUTO: 26.4 % (ref 40–53)
HGB BLD-MCNC: 8.5 G/DL (ref 13.3–17.7)
HGB UR QL STRIP: NEGATIVE
HIV 1+2 AB+HIV1 P24 AG SERPL QL IA: NONREACTIVE
IMM GRANULOCYTES # BLD: 0 10E9/L (ref 0–0.4)
IMM GRANULOCYTES NFR BLD: 0 %
INTERPRETATION ECG - MUSE: NORMAL
KETONES UR STRIP-MCNC: NEGATIVE MG/DL
LEUKOCYTE ESTERASE UR QL STRIP: NEGATIVE
LIPASE SERPL-CCNC: 141 U/L (ref 73–393)
LYMPHOCYTES # BLD AUTO: 0.7 10E9/L (ref 0.8–5.3)
LYMPHOCYTES NFR BLD AUTO: 39.4 %
MCH RBC QN AUTO: 27.5 PG (ref 26.5–33)
MCHC RBC AUTO-ENTMCNC: 32.2 G/DL (ref 31.5–36.5)
MCV RBC AUTO: 85 FL (ref 78–100)
MONOCYTES # BLD AUTO: 0.2 10E9/L (ref 0–1.3)
MONOCYTES NFR BLD AUTO: 11.7 %
NEUTROPHILS # BLD AUTO: 0.9 10E9/L (ref 1.6–8.3)
NEUTROPHILS NFR BLD AUTO: 47.9 %
NITRATE UR QL: NEGATIVE
NRBC # BLD AUTO: 0 10*3/UL
NRBC BLD AUTO-RTO: 0 /100
PH UR STRIP: 7.5 PH (ref 5–7)
PLATELET # BLD AUTO: 171 10E9/L (ref 150–450)
POTASSIUM SERPL-SCNC: 4.2 MMOL/L (ref 3.4–5.3)
PROT SERPL-MCNC: 5.2 G/DL (ref 6.8–8.8)
RBC # BLD AUTO: 3.09 10E12/L (ref 4.4–5.9)
RBC #/AREA URNS AUTO: <1 /HPF (ref 0–2)
SODIUM SERPL-SCNC: 146 MMOL/L (ref 133–144)
SOURCE: ABNORMAL
SP GR UR STRIP: 1.01 (ref 1–1.03)
TROPONIN I SERPL-MCNC: <0.015 UG/L (ref 0–0.04)
UROBILINOGEN UR STRIP-MCNC: NORMAL MG/DL (ref 0–2)
WBC # BLD AUTO: 1.9 10E9/L (ref 4–11)
WBC #/AREA URNS AUTO: <1 /HPF (ref 0–5)

## 2018-06-07 PROCEDURE — 00000146 ZZHCL STATISTIC GLUCOSE BY METER IP

## 2018-06-07 PROCEDURE — 87389 HIV-1 AG W/HIV-1&-2 AB AG IA: CPT | Performed by: EMERGENCY MEDICINE

## 2018-06-07 PROCEDURE — 93005 ELECTROCARDIOGRAM TRACING: CPT

## 2018-06-07 PROCEDURE — 80053 COMPREHEN METABOLIC PANEL: CPT | Performed by: EMERGENCY MEDICINE

## 2018-06-07 PROCEDURE — 99285 EMERGENCY DEPT VISIT HI MDM: CPT

## 2018-06-07 PROCEDURE — 84484 ASSAY OF TROPONIN QUANT: CPT | Performed by: EMERGENCY MEDICINE

## 2018-06-07 PROCEDURE — 81001 URINALYSIS AUTO W/SCOPE: CPT | Performed by: EMERGENCY MEDICINE

## 2018-06-07 PROCEDURE — 71046 X-RAY EXAM CHEST 2 VIEWS: CPT

## 2018-06-07 PROCEDURE — 87086 URINE CULTURE/COLONY COUNT: CPT | Performed by: EMERGENCY MEDICINE

## 2018-06-07 PROCEDURE — 85025 COMPLETE CBC W/AUTO DIFF WBC: CPT | Performed by: EMERGENCY MEDICINE

## 2018-06-07 PROCEDURE — 99285 EMERGENCY DEPT VISIT HI MDM: CPT | Mod: 25 | Performed by: EMERGENCY MEDICINE

## 2018-06-07 PROCEDURE — 36415 COLL VENOUS BLD VENIPUNCTURE: CPT | Performed by: EMERGENCY MEDICINE

## 2018-06-07 PROCEDURE — 83690 ASSAY OF LIPASE: CPT | Performed by: EMERGENCY MEDICINE

## 2018-06-07 PROCEDURE — 93010 ELECTROCARDIOGRAM REPORT: CPT | Mod: Z6 | Performed by: EMERGENCY MEDICINE

## 2018-06-07 RX ORDER — ASPIRIN 81 MG/1
324 TABLET, CHEWABLE ORAL ONCE
Status: DISCONTINUED | OUTPATIENT
Start: 2018-06-07 | End: 2018-06-07 | Stop reason: HOSPADM

## 2018-06-07 RX ORDER — FERROUS SULFATE 325(65) MG
325 TABLET ORAL 2 TIMES DAILY
Qty: 60 TABLET | Refills: 2 | Status: SHIPPED | OUTPATIENT
Start: 2018-06-07 | End: 2019-07-16

## 2018-06-07 NOTE — ED AVS SNAPSHOT
[facility], [place], Emergency Department    [address] [place] [address]    Phone:  [phone]                                       Bruce Barrera   MRN: [id number]    Department:  [facility], Emergency Department   Date of Visit:  6/7/2018           After Visit Summary Signature Page     I have received my discharge instructions, and my questions have been answered. I have discussed any challenges I see with this plan with the nurse or doctor.    ..........................................................................................................................................  Patient/Patient Representative Signature      ..........................................................................................................................................  Patient Representative Print Name and Relationship to Patient    ..................................................               ................................................  Date                                            Time    ..........................................................................................................................................  Reviewed by Signature/Title    ...................................................              ..............................................  Date                                                            Time

## 2018-06-07 NOTE — ED PROVIDER NOTES
"  History     Chief Complaint   Patient presents with     Chest Pain     Palpitations     [facility]  Bruce Barrera is a 37 year old male who presents for chest pain. Patient reports that he had onset of the pain around [id number] when he had an argument with family members. He reports that he has had this pain several times before, usually lasts several hours then resolves. Patient felt \"my heart slowing down\". Pain had greatly improved prior to arrival in the ED. Patient reports chronic mild shortness of breath for months that is unchanged. No cough, no pain with deep breath, no prior history of DVT/PE. Patient had 3 episodes vomiting yesterday, no diarrhea.     Patient has history of 40-50 lb unintentional weight loss that occurred 6-12 months ago. Patient reports he had been prescribed antibiotics for H pylori related to that weight loss, but that he had not taken many of them, has started them again the past few days. Patient concerned about his kidney function due to urinating somewhat frequently, no dysuria.     I have reviewed the Medications, Allergies, Past Medical and Surgical History, and Social History in the Epic system.    Review of Systems  A 10-system review of systems was completed with pertinent positives and negatives noted in the HPI, otherwise negative.     Physical Exam   BP: 94/77  Pulse: 72  Heart Rate: 75  Temp: 97.9  F (36.6  C)  Resp: 18  Height: 167.6 cm (5' 6\")  Weight: 49.9 kg (110 lb)  SpO2: 100 %      Physical Exam  BP 95/72  Pulse 72  Temp 97.9  F (36.6  C) (Oral)  Resp 18  Ht 1.676 m (5' 6\")  Wt 49.9 kg (110 lb)  SpO2 99%  BMI 17.75 kg/m2  General: very thin-appearing, no acute distress  HENT: MMM, no oropharyngeal lesions  Eyes: PERRL, normal sclerae, no conjunctival pallor  Neck: non-tender, supple  Cardio: borderline bradycardic, regular rhythm, normal heart sounds, extremities well perfused  Resp: Normal work of breathing, clear breath sounds  Chest/Back: no visual signs of " trauma, no CVA tenderness  Abdomen: no tenderness, non-distended, no rebound, no guarding  Neuro: alert and fully oriented. CN II-XII grossly intact. Grossly normal strength and sensation in all extremities.   MSK: no deformities.   Integumentary/Skin: no rash, normal color  Psych: normal affect, normal behavior    ED Course     ED Course     Procedures             EKG Interpretation:      Interpreted by Freddie Estrada  Time reviewed: 0615  Symptoms at time of EKG: chest pain   Rhythm: normal sinus   Rate: bradycardic, 50s  Axis: normal  Ectopy: none  Conduction: normal  ST Segments/ T Waves: No ST-T wave changes  Q Waves: none  Comparison to prior: Unchanged from 11/8/2017    Clinical Impression: sinus bradycardia, otherwise normal EKG    Critical Care time:  none          Labs Ordered and Resulted from Time of ED Arrival Up to the Time of Departure from the ED   CBC WITH PLATELETS DIFFERENTIAL - Abnormal; Notable for the following:        Result Value    WBC 1.9 (*)     RBC Count 3.09 (*)     Hemoglobin 8.5 (*)     Hematocrit 26.4 (*)     Absolute Neutrophil 0.9 (*)     Absolute Lymphocytes 0.7 (*)     All other components within normal limits   COMPREHENSIVE METABOLIC PANEL - Abnormal; Notable for the following:     Sodium 146 (*)     Chloride 112 (*)     Creatinine 0.56 (*)     Calcium 8.0 (*)     Albumin 2.7 (*)     Protein Total 5.2 (*)     All other components within normal limits   ROUTINE UA WITH MICROSCOPIC - Abnormal; Notable for the following:     pH Urine 7.5 (*)     All other components within normal limits   TROPONIN I   LIPASE   GLUCOSE BY METER            Assessments & Plan (with Medical Decision Making)   In the ED, the patient was afebrile and hemodynamically stable. History notable for chest pain starting about 5 hours prior to arrival. Exam notable for very thin overall body, borderline bradycardic rate.     ECG without evidence of acute ischemia, troponin wnl - ACS very unlikely. Patient  declined aspirin and GI cocktail. CXR without evidence of pneumothorax nor pneumonia. PERC met. Labs notable for anemia with Hgb 8.5, leukopenia, hypoalbuminemia. Given large amount of weight loss, there is concern for malignancy, HIV, or other pathology besides the known H pylori. Discussed this with the patient, who reported that 3 weeks ago he had a chest/abdomen/pelvis CT with his PCP in [place] and that it was normal, declined CT today. He also reported that his Hgb was 9 during that visit, making today's anemia not an acute drop.     The complete clinical picture is most consistent with chest pain. After counseling on the diagnosis, work-up, and treatment plan, the patient was discharged to home. Recommended continuing amoxicillin and clarithromycin for H pylori treatment, and omeprazole and iron supplement prescription provided. The patient was advised to follow-up with GI as soon as he is able; referral placed. The patient was advised to return to the ED if worsening symptoms, or if there are any urgent/life-threatening concerns.       Clinical Impression:  Atypical chest pain  Weight loss   Vomiting       Freddie Estrada MD  Emergency Medicine       I have reviewed the nursing notes.    I have reviewed the findings, diagnosis, plan and need for follow up with the patient.    Discharge Medication List as of 6/7/2018  8:14 AM      START taking these medications    Details   ferrous sulfate (IRON) 325 (65 Fe) MG tablet Take 1 tablet (325 mg) by mouth 2 times daily, Disp-60 tablet, R-2, Local Print             Final diagnoses:   Acute chest pain   Anemia, unspecified type       6/7/2018   [facility], EMERGENCY DEPARTMENT     Freddie Estrada MD  06/07/18 1500

## 2018-06-07 NOTE — ED NOTES
"Pt states his is now being seen for a \"medication interaction.\" He has only taken antibiotics yesterday.   "

## 2018-06-07 NOTE — ED TRIAGE NOTES
"Pt was trying to sleeping and took antibiotics, pt states he \"has stomach issues.\"  Pt \"felt his heart slowing down.\" Pt states he has not slept for 2 days and that his kidney is not filtering. Pt states that his \"pancreas is not working, i'm not working right.\" Pt looks thin and believes this may be why his pancreas is not working.   "

## 2018-06-07 NOTE — ED AVS SNAPSHOT
[facility], Emergency Department    [address] [address]    Phone:  [phone]                                       Bruce Barrera   MRN: [id number]    Department:  [facility], Emergency Department   Date of Visit:  6/7/2018           Patient Information     Date Of Birth          1980        Your diagnoses for this visit were:     Acute chest pain     Acute superficial gastritis without hemorrhage     Anemia, unspecified type        You were seen by Freddie Estrada MD.        Discharge Instructions       Please make an appointment to follow up with GI Clinic (phone: [phone]) as soon as possible    Return to the ED if you are having worsening symptoms, or any other urgent/life-threatening concerns.       24 Hour Appointment Hotline       To make an appointment at any [facility] clinic, call [phone] ([phone]). If you don't have a family doctor or clinic, we will help you find one. [facility] clinics are conveniently located to serve the needs of you and your family.          ED Discharge Orders     GASTROENTEROLOGY ADULT REF CONSULT ONLY       Chronic abdominal pain; 50 lb weight loss; previous H pylori positive; Hgb decreased    Preferred Location: [facility] [phone]      Please be aware that coverage of these services is subject to the terms and limitations of your health insurance plan.  Call member services at your health plan with any benefit or coverage questions.  Any procedures must be performed at a [facility] facility OR coordinated by your clinic's referral office.    Please bring the following with you to your appointment:    (1) Any X-Rays, CTs or MRIs which have been performed.  Contact the facility where they were done to arrange for  prior to your scheduled appointment.    (2) List of current medications   (3) This referral request   (4) Any documents/labs given to you for this referral                     Review of your  medicines      START taking        Dose / Directions Last dose taken    ferrous sulfate 325 (65 Fe) MG tablet   Commonly known as:  IRON   Dose:  325 mg   Quantity:  60 tablet        Take 1 tablet (325 mg) by mouth 2 times daily   Refills:  2          CONTINUE these medicines which may have CHANGED, or have new prescriptions. If we are uncertain of the size of tablets/capsules you have at home, strength may be listed as something that might have changed.        Dose / Directions Last dose taken    omeprazole 20 MG CR capsule   Commonly known as:  priLOSEC   Dose:  20 mg   What changed:  when to take this   Quantity:  60 capsule        Take 1 capsule (20 mg) by mouth 2 times daily   Refills:  0          Our records show that you are taking the medicines listed below. If these are incorrect, please call your family doctor or clinic.        Dose / Directions Last dose taken    amoxicillin 500 MG capsule   Commonly known as:  AMOXIL   Dose:  1000 mg   Quantity:  56 capsule        Take 2 capsules (1,000 mg) by mouth 2 times daily   Refills:  0        clarithromycin 500 MG tablet   Commonly known as:  BIAXIN   Dose:  500 mg   Quantity:  28 tablet        Take 1 tablet (500 mg) by mouth 2 times daily   Refills:  0                Prescriptions were sent or printed at these locations (2 Prescriptions)                   Other Prescriptions                Printed at Department/Unit printer (2 of 2)         ferrous sulfate (IRON) 325 (65 Fe) MG tablet               omeprazole (PRILOSEC) 20 MG CR capsule                Procedures and tests performed during your visit     CBC with platelets differential    Comprehensive metabolic panel    EKG 12 lead    Glucose by meter    HIV Antigen Antibody Combo    Lipase    Rapid HIV 1 and 2 Antigen Antibody    Troponin I    UA with Microscopic    Urine Culture    XR Chest 2 Views      Orders Needing Specimen Collection     None      Pending Results     Date and Time Order Name Status  Description    6/7/2018 0700 HIV Antigen Antibody Combo In process     6/7/2018 0700 Rapid HIV 1 and 2 Antigen Antibody In process     6/7/2018 0659 Urine Culture In process     6/7/2018 0537 EKG 12 lead Preliminary             Pending Culture Results     Date and Time Order Name Status Description    6/7/2018 0659 Urine Culture In process             Pending Results Instructions     If you had any lab results that were not finalized at the time of your Discharge, you can call the ED Lab Result RN at [phone]. You will be contacted by this team for any positive Lab results or changes in treatment. The nurses are available 7 days a week from 10A to 6:30P.  You can leave a message 24 hours per day and they will return your call.        Thank you for choosing [facility]       Thank you for choosing [facility] for your care. Our goal is always to provide you with excellent care. Hearing back from our patients is one way we can continue to improve our services. Please take a few minutes to complete the written survey that you may receive in the mail after you visit with us. Thank you!        [employer] Information     [employer] gives you secure access to your electronic health record. If you see a primary care provider, you can also send messages to your care team and make appointments. If you have questions, please call your primary care clinic.  If you do not have a primary care provider, please call [phone] and they will assist you.        Care EveryWhere ID     This is your Care EveryWhere ID. This could be used by other organizations to access your [facility] medical records  [id number]        Equal Access to Services     MADAI RAMIREZ : Hadii shital hungo Sojakali, waaxda luqadaha, qaybta kaalmada joyce lyn . So [facility] [phone].    ATENCIÓN: Si habla español, tiene a mar disposición servicios gratuitos de asistencia lingüística. Llame al [phone].    We comply with  applicable federal civil rights laws and [place] laws. We do not discriminate on the basis of race, color, national origin, age, disability, sex, sexual orientation, or gender identity.            After Visit Summary       This is your record. Keep this with you and show to your community pharmacist(s) and doctor(s) at your next visit.

## 2018-06-07 NOTE — DISCHARGE INSTRUCTIONS
Please make an appointment to follow up with GI Clinic (phone: [phone]) as soon as possible    Return to the ED if you are having worsening symptoms, or any other urgent/life-threatening concerns.

## 2018-06-08 LAB
BACTERIA SPEC CULT: NO GROWTH
Lab: NORMAL
SPECIMEN SOURCE: NORMAL

## 2019-07-16 ENCOUNTER — OFFICE VISIT (OUTPATIENT)
Dept: PEDIATRICS | Facility: CLINIC | Age: 39
End: 2019-07-16

## 2019-07-16 VITALS
BODY MASS INDEX: 22.73 KG/M2 | HEART RATE: 60 BPM | SYSTOLIC BLOOD PRESSURE: 100 MMHG | WEIGHT: 141.4 LBS | OXYGEN SATURATION: 99 % | HEIGHT: 66 IN | TEMPERATURE: 98.5 F | DIASTOLIC BLOOD PRESSURE: 80 MMHG

## 2019-07-16 DIAGNOSIS — R10.84 ABDOMINAL PAIN, GENERALIZED: Primary | ICD-10-CM

## 2019-07-16 LAB
ALBUMIN SERPL-MCNC: 3.9 G/DL (ref 3.4–5)
ALBUMIN UR-MCNC: NEGATIVE MG/DL
ALP SERPL-CCNC: 41 U/L (ref 40–150)
ALT SERPL W P-5'-P-CCNC: 20 U/L (ref 0–70)
ANION GAP SERPL CALCULATED.3IONS-SCNC: <1 MMOL/L (ref 3–14)
APPEARANCE UR: CLEAR
AST SERPL W P-5'-P-CCNC: 15 U/L (ref 0–45)
BILIRUB SERPL-MCNC: 0.3 MG/DL (ref 0.2–1.3)
BILIRUB UR QL STRIP: NEGATIVE
BUN SERPL-MCNC: 10 MG/DL (ref 7–30)
CALCIUM SERPL-MCNC: 9.2 MG/DL (ref 8.5–10.1)
CHLORIDE SERPL-SCNC: 111 MMOL/L (ref 94–109)
CO2 SERPL-SCNC: 30 MMOL/L (ref 20–32)
COLOR UR AUTO: YELLOW
CREAT SERPL-MCNC: 0.95 MG/DL (ref 0.66–1.25)
ERYTHROCYTE [DISTWIDTH] IN BLOOD BY AUTOMATED COUNT: 14.9 % (ref 10–15)
GFR SERPL CREATININE-BSD FRML MDRD: >90 ML/MIN/{1.73_M2}
GLUCOSE SERPL-MCNC: 89 MG/DL (ref 70–99)
GLUCOSE UR STRIP-MCNC: NEGATIVE MG/DL
HCT VFR BLD AUTO: 40.9 % (ref 40–53)
HGB BLD-MCNC: 12.7 G/DL (ref 13.3–17.7)
HGB UR QL STRIP: NEGATIVE
KETONES UR STRIP-MCNC: NEGATIVE MG/DL
LEUKOCYTE ESTERASE UR QL STRIP: NEGATIVE
MCH RBC QN AUTO: 24.1 PG (ref 26.5–33)
MCHC RBC AUTO-ENTMCNC: 31.1 G/DL (ref 31.5–36.5)
MCV RBC AUTO: 78 FL (ref 78–100)
MUCOUS THREADS #/AREA URNS LPF: PRESENT /LPF
NITRATE UR QL: NEGATIVE
PH UR STRIP: 5 PH (ref 5–7)
PLATELET # BLD AUTO: 169 10E9/L (ref 150–450)
POTASSIUM SERPL-SCNC: 4 MMOL/L (ref 3.4–5.3)
PROT SERPL-MCNC: 7.5 G/DL (ref 6.8–8.8)
RBC # BLD AUTO: 5.28 10E12/L (ref 4.4–5.9)
RBC #/AREA URNS AUTO: ABNORMAL /HPF
SODIUM SERPL-SCNC: 141 MMOL/L (ref 133–144)
SOURCE: ABNORMAL
SP GR UR STRIP: 1.02 (ref 1–1.03)
UROBILINOGEN UR STRIP-MCNC: NORMAL MG/DL (ref 0–2)
WBC # BLD AUTO: 2.9 10E9/L (ref 4–11)
WBC #/AREA URNS AUTO: ABNORMAL /HPF

## 2019-07-16 PROCEDURE — 80053 COMPREHEN METABOLIC PANEL: CPT | Performed by: INTERNAL MEDICINE

## 2019-07-16 PROCEDURE — 36415 COLL VENOUS BLD VENIPUNCTURE: CPT | Performed by: INTERNAL MEDICINE

## 2019-07-16 PROCEDURE — 99204 OFFICE O/P NEW MOD 45 MIN: CPT | Performed by: INTERNAL MEDICINE

## 2019-07-16 PROCEDURE — 85027 COMPLETE CBC AUTOMATED: CPT | Performed by: INTERNAL MEDICINE

## 2019-07-16 PROCEDURE — 81001 URINALYSIS AUTO W/SCOPE: CPT | Performed by: INTERNAL MEDICINE

## 2019-07-16 ASSESSMENT — MIFFLIN-ST. JEOR: SCORE: 1494.14

## 2019-07-16 NOTE — PROGRESS NOTES
Abdominal/Flank pain    Bruce Barrera is a 38 year old male who presents to clinic today for the following health issues:    HPI   ABDOMINAL and FLANK   PAIN     Onset: 1.5 years    Description:   Character: Burning  Location: epigastric region right upper quadrant left upper quadrant mid back  Radiation: Back    Intensity: severe    Progression of Symptoms:  worsening    Accompanying Signs & Symptoms:  Fever/Chills?: no   Gas/Bloating: no   Nausea: no   Vomitting: YES  Diarrhea?: no   Constipation:YES  Dysuria or Hematuria: no    History:   Trauma: no   Previous similar pain: YES   Previous tests done: CT-11/21/17    Precipitating factors:   Does the pain change with:     Food: YES- worse when he eats     BM: no     Urination: no     Alleviating factors:  Not eating    Therapies Tried and outcome: None    LMP:  not applicable     Right ear pain for the last 2 weeks.    38-year-old young man comes in complaining of abdominal pain.  When questioned how long he has had the symptoms, he was awake.  Initially said months and then it appears he has had symptoms for almost 2 years.  It describes the pain as cramping at times comes and goes and worst at times an hour or so after meal.  Denies nausea or vomiting.  Complains of constipation but then he stated that he has a bowel movement every day and is not hard.  Denies fever chills or burning in the urine.  Indicates that few months ago he had some blood in the stool.    Reviewing the records in care everywhere, it appears he has had abdominal symptoms he was living in [place] almost 2 years ago.  Appears he was seen by 2 GI physicians in [place] and then here as well.  He had colonoscopy and upper endoscopy in 2007.  He was one time was treated for H. Pylori.    The patient was not forthcoming regarding his complaints.  When I noticed on reviewing the records that he had leukopenia of long-standing and also anemia indicated that he has been thoroughly evaluated for it  "and no causes been found.  Indicates he has had multiple scans of the abdomen.  I was not able to find evidence of that in care everywhere.  Perhaps he had them in [place].        There is no problem list on file for this patient.    Past Surgical History:   Procedure Laterality Date     COLONOSCOPY N/A 11/22/2017    Procedure: COLONOSCOPY;  EGD/Colonoscopy;  Surgeon: Mukesh Galeana MD;  Location:  GI     ESOPHAGOSCOPY, GASTROSCOPY, DUODENOSCOPY (EGD), COMBINED N/A 11/22/2017    Procedure: COMBINED ESOPHAGOSCOPY, GASTROSCOPY, DUODENOSCOPY (EGD), BIOPSY SINGLE OR MULTIPLE;;  Surgeon: Mukesh Galeana MD;  Location:  GI       Social History     Tobacco Use     Smoking status: Never Smoker     Smokeless tobacco: Never Used   Substance Use Topics     Alcohol use: Yes     Comment: social     History reviewed. No pertinent family history.      No current outpatient medications on file.     Allergies   Allergen Reactions     Chloroquine        Reviewed and updated as needed this visit by Provider         Review of Systems   ROS COMP: Constitutional, HEENT, cardiovascular, pulmonary, GI, , musculoskeletal, neuro, skin, endocrine and psych systems are negative, except as otherwise noted.      Objective    /80   Pulse 60   Temp 98.5  F (36.9  C) (Temporal)   Ht 1.676 m (5' 6\")   Wt 64.1 kg (141 lb 6.4 oz)   SpO2 99%   BMI 22.82 kg/m    Body mass index is 22.82 kg/m .  Physical Exam   GENERAL: healthy, alert and no distress  NECK: no adenopathy, no asymmetry, masses, or scars and thyroid normal to palpation  RESP: lungs clear to auscultation - no rales, rhonchi or wheezes  CV: regular rate and rhythm, normal S1 S2, no S3 or S4, no murmur, click or rub, no peripheral edema and peripheral pulses strong  ABDOMEN: soft, nontender, no hepatosplenomegaly, no masses and bowel sounds normal   (male): normal male genitalia without lesions or urethral discharge, no hernia  MS: no gross musculoskeletal " defects noted, no edema    Diagnostic Test Results:  Labs reviewed in Epic    Lab performed today shows white count low at 2.9 hemoglobin slightly low at 12.7.  Electrolytes and liver function studies were good.  Renal function was good.  UA was negative.    Assessment & Plan     1.  Abdominal pain of at least 2 years etiology unclear.  Based on his symptoms I think the next option would be to do upper endoscopy and rule out peptic ulcer disease.  He gives history of peptic ulcer disease in the past.  He declined to undergo ultrasound of the abdomen or the CT scan.  Cited that he had many in the past.  2.  Chronic leukopenia and mild anemia.  Patient should at least be seen by hematologist.  He declined today.    I will set him up to right upper endoscopy performed.      Henri Ruvalcaba MD  [facility]

## 2019-07-19 ENCOUNTER — HOSPITAL ENCOUNTER (OUTPATIENT)
Facility: AMBULATORY SURGERY CENTER | Age: 39
End: 2019-07-19
Attending: SPECIALIST | Admitting: SPECIALIST

## 2024-08-01 NOTE — PROGRESS NOTES
[facility]         HPI:       Bruce Barrera is a 37 year old male with a past medical history of sinus bradycardia, chronic abdominal pain and recent diagnosis of H. pylori who presents for hospital follow up and to establish care. Patient is from [place] and came to [place] 1-2 weeks ago for further evaluation of his abdominal pain and weight loss. Patient is typically seen by Dr. Robbie Pike and [facility] in [facility].     He notes that over the past 6 months he developed severe epigastric pain and in that time he lost 40-50 pounds. He was previously evaluated in [place], where he was found to be neutropenic and thrombocytopenic. CT abdomen pelvis demonstrated 2 indeterminate arterially enhancing lesions in the liver and reflux of contrast into the IVR and right hepatic vein which can indicate diminished cardiac function. Cardiac workup at OSH demonstrated stable sinus bradycardia, but echo and imaging not consistent with heart failure per chart.     He was seen at [facility] ED for evaluation and subsequently underwent colonoscopy and upper endoscopy with pathology positive for H. Pylori. He was given clarithromycin and amoxicillin in addition to his home omeprazole with improvement in symptoms.     Today, patient endorses a 4-5 day history of diffuse hyperpigmented, pruritic rash over his back and the dorsal aspect of both hands. It is unclear where this first began and he notes it has not changed in appearance since onset. He also voiced concern about a possible rectal prolapse as he feels that his pelvic floor is weak from his rapid weight loss. Of note, patient plans to return home to [place] in 2 days without plans to continue care in [place].    PMH  -recent diagnosis of H. pylori    SHx  -denies    Family hx  - mother unremarkable  - father unremarkable  - sister sickle cell    Soc  - Currently staying with mother and sister  - Originally from [place], [place] at 16 years old  -  Recent travel to [place] (05/2017) and [facility] (08/2017)  - Denies smoking, ilicit drug use, IV drug use  - Previously a marathon runner  - Last sexually active 2 months ago, prefers female partners    Meds  - amoxicillin  - omeprazole  - clarithromycin  - probiotic     Problem, Medication and Allergy Lists were reviewed and are current.  Patient is a new patient to this clinic and so  I reviewed/updated the Past Medical History, the Family History and the Social History.          Review of Systems:   Review of Systems     Constitutional:  Positive for weight loss, fatigue, recent stressors, incisional pain and increased energy. Negative for height loss, hyperactivity and confused.   HENT:  Negative for ear pain, hearing loss, tinnitus, nosebleeds, trouble swallowing, hoarse voice, mouth sores, sore throat, ear discharge, tooth pain, gum tenderness, taste disturbance, smell disturbance, hearing aid, bleeding gums, dry mouth, sinus pain, sinus congestion and neck mass.    Eyes:  Negative for double vision, pain, redness, eye pain, decreased vision, eye watering, eye bulging, eye dryness, flashing lights, spots, floaters, strabismus, tunnel vision, jaundice and eye irritation.   Respiratory:   Negative for cough, hemoptysis, sputum production, shortness of breath, wheezing, sleep disturbances due to breathing, snores loudly, respiratory pain, dyspnea on exertion, cough disturbing sleep and postural dyspnea.    Cardiovascular:  Negative for chest pain, dyspnea on exertion, palpitations, orthopnea, claudication, leg swelling, fingers/toes turn blue, hypertension, hypotension, syncope, history of heart murmur, chest pain on exertion, chest pain at rest, pacemaker, few scattered varicosities, leg pain, sleep disturbances due to breathing, tachycardia, light-headedness, exercise intolerance and edema.   Gastrointestinal:  Positive for abdominal pain, constipation and bloating. Negative for heartburn, nausea,  "vomiting, diarrhea, blood in stool, melena, rectal pain, bowel incontinence, jaundice, coffee ground emesis and change in stool.   Genitourinary:  Positive for voiding less frequently and male genitourinary complaint. Negative for bladder incontinence, dysuria, urgency, hematuria, flank pain, difficulty urinating, nocturia, scrotal pain, ulcerations, penile discharge and reduced libido.   Musculoskeletal:  Negative for myalgias, back pain, joint swelling, arthralgias, stiffness, muscle cramps, neck pain, bone pain, muscle weakness and fracture.   Skin:  Positive for itching and rash. Negative for nail changes, poor wound healing, hair changes, skin changes, acne, warts, poor wound healing, scarring, flaky skin, Raynaud's phenomenon, sensitivity to sunlight and skin thickening.   Neurological:  Negative for dizziness, tingling, tremors, speech change, seizures, loss of consciousness, weakness, light-headedness, numbness, headaches, disturbances in coordination, extremity numbness, memory loss, difficulty walking and paralysis.   Endo/Heme:  Negative for anemia, swollen glands and bruises/bleeds easily.   Psychiatric/Behavioral:  Negative for depression, memory loss, decreased concentration, mood swings and panic attacks.    Endocrine:  Negative for altered temperature regulation, polydipsia and change in facial hair.    I have personally reviewed and updated the complete ROS on the day of the visit.           Physical Exam:   /76  Pulse 77  Temp 98.2  F (36.8  C) (Oral)  Ht 1.68 m (5' 6.14\")  Wt 45.3 kg (99 lb 14.4 oz)  BMI 16.06 kg/m2  Body mass index is 16.06 kg/(m^2).  Vitals were reviewed       GENERAL APPEARANCE: Cachetic, temporal wasting, alert and no distress     EYES: EOMI,  PERRL     HENT: ear canals and TM's normal, aphthous ulcers on left buccal mucosa     NECK: no adenopathy, no asymmetry, masses, or scars and thyroid normal to palpation     RESP: lungs clear to auscultation - no rales, " rhonchi or wheezes     CV: regular rates and rhythm, normal S1 S2, no S3 or S4 and no murmur, click or rub     ABDOMEN:  soft, nontender, no HSM or masses and bowel sounds normal     Rectal exam: prostate symmetric w/o nodularity, no masses palpated, rectal tone intact     MS: extremities normal- no gross deformities noted, no evidence of inflammation in joints, FROM in all extremities.     SKIN: Macular hyperpigmented rash over dorsal aspect of hands bilaterally and lower back     NEURO: Normal strength and tone, sensory exam grossly normal, mentation intact and speech normal     PSYCH: mentation appears normal. and affect normal/bright     LYMPHATICS: No axillary, cervical, or supraclavicular nodes        Results:      Results from the last 24 hoursNo results found for this or any previous visit (from the past 24 hour(s)).  Assessment and Plan   Bruce Barrera is a 37 year old male with recent diagnosis of H pylori presented today to establish care.     # Unintentional weight loss  # Elevation of transaminases  # Neutropenia  # Thrombocytopenia   Recent unintentional weight loss of 50 pounds over the last 6 months. Patient denies any supplements or weight loss agents. Admits to poor PO intake over the last several months 2/2 abdominal pain, but notes that this pain is significantly improved since starting his H. Pylori regimen. Concern for possible HIV in given neutropenia in setting of severe weight loss. Hepatic lesions likely represent hemangiomas, but cannot rule out hepatitis in setting of elevated transaminases. Patient instructed to follow closely with his PCP (Dr. Robbie Pike) in [place] when agreeable to blood draws for further evaluation.  -- Recommend extensive workup including HIV, hepatitis B, HCV, and TSH/T4  -- Patient declines any blood draws at this time limiting our ability to evaluate his recent symptoms    # Pelvic floor weakness  Patient endorses history of constipation over the last several  months. He notes that he previously used miralax with moderate relief, but has recently switched to ducolax with improved results. He denies bowel incontinence, but feels that he no longer has the stregth to evacuate his bowels. Rectal exam did not demonstrate any signs of rectal mass, hemorrhoids or prolapse. Rectal tone intact.   -- Recommend referral to pelvic floor therapy in [place]    # Hyperpigmented macular rash  Reports 4-5 day history of rash over hands and back. Patient denies that rash has progressed in size but has become increasingly pruritic. No signs of discharge or excoriation. Patient has initially attributed this to dry skin, but has not improved with lotion.   -- Recommend dermatology referral for further evaluation    # H. pylori   Confirmed by pathology after recent upper endoscopy. Intiated on triple therapy which has improved his abdominal discomfort to 3/10.   -- Continue home clarithromycin, amoxicillin, and omeprazole    # Health maintenance   -- Declines influenza vaccination  -- Declines TDAP  -- Declines lipid screening     Options for treatment and follow-up care were reviewed with the patient. Bruce Barrera engaged in the decision making process and verbalized understanding of the options discussed and agreed with the final plan.    Javier Elizabeth, DO  Internal Medicine, PGY1  Pager [phone]    Pt was seen and plan of care discussed with Dr. Sewell    Attending Addendum:  Patient seen and examined with resident in clinic today.  Pertinent portions of history and exam were independently verified by myself.  I agree with the exam and plan as outlined above with the following modifications: none. Patient presented with recent history as noted above but reported intent to move back to [place] this week, as well as declined any further lab testing today, limiting our ability to further evaluate his symptoms and place referrals.  We expressed our concerns with this approach and the possibility  that he may have undiagnosed conditions, however, he was intent in his plan.  Rosa Sewell MD  Internal Medicine        Rooming Note    Health Maintenance  Health Maintenance Due   Topic Date Due     TETANUS IMMUNIZATION (SYSTEM ASSIGNED)  08/20/1996     LIPID SCREEN Q5 YR MALE (SYSTEM ASSIGNED)  08/20/2013     INFLUENZA VACCINE (SYSTEM ASSIGNED)  09/01/2017   Patient declined to discuss HM.     No

## (undated) RX ORDER — FENTANYL CITRATE 50 UG/ML
INJECTION, SOLUTION INTRAMUSCULAR; INTRAVENOUS
Status: DISPENSED
Start: 2017-11-22

## (undated) RX ORDER — SIMETHICONE 20 MG/.3ML
EMULSION ORAL
Status: DISPENSED
Start: 2017-11-22

## (undated) RX ORDER — DIPHENHYDRAMINE HYDROCHLORIDE 50 MG/ML
INJECTION INTRAMUSCULAR; INTRAVENOUS
Status: DISPENSED
Start: 2017-11-22